# Patient Record
Sex: FEMALE | Race: WHITE | NOT HISPANIC OR LATINO | URBAN - METROPOLITAN AREA
[De-identification: names, ages, dates, MRNs, and addresses within clinical notes are randomized per-mention and may not be internally consistent; named-entity substitution may affect disease eponyms.]

---

## 2017-04-21 ENCOUNTER — OUTPATIENT (OUTPATIENT)
Dept: OUTPATIENT SERVICES | Facility: HOSPITAL | Age: 67
LOS: 1 days | End: 2017-04-21
Payer: MEDICARE

## 2017-04-21 PROCEDURE — 74177 CT ABD & PELVIS W/CONTRAST: CPT | Mod: 26

## 2017-04-22 ENCOUNTER — INPATIENT (INPATIENT)
Facility: HOSPITAL | Age: 67
LOS: 1 days | Discharge: HOME | DRG: 392 | End: 2017-04-24
Payer: MEDICARE

## 2017-04-22 VITALS
TEMPERATURE: 98 F | HEIGHT: 66 IN | RESPIRATION RATE: 18 BRPM | DIASTOLIC BLOOD PRESSURE: 80 MMHG | OXYGEN SATURATION: 96 % | WEIGHT: 196.21 LBS | HEART RATE: 62 BPM | SYSTOLIC BLOOD PRESSURE: 142 MMHG

## 2017-04-22 DIAGNOSIS — Z29.9 ENCOUNTER FOR PROPHYLACTIC MEASURES, UNSPECIFIED: ICD-10-CM

## 2017-04-22 DIAGNOSIS — F41.9 ANXIETY DISORDER, UNSPECIFIED: ICD-10-CM

## 2017-04-22 DIAGNOSIS — N80.9 ENDOMETRIOSIS, UNSPECIFIED: ICD-10-CM

## 2017-04-22 DIAGNOSIS — R10.9 UNSPECIFIED ABDOMINAL PAIN: ICD-10-CM

## 2017-04-22 LAB
ALBUMIN SERPL ELPH-MCNC: 3.8 G/DL — SIGNIFICANT CHANGE UP (ref 3.4–5)
ALP SERPL-CCNC: 79 U/L — SIGNIFICANT CHANGE UP (ref 40–120)
ALT FLD-CCNC: 18 U/L — SIGNIFICANT CHANGE UP (ref 12–42)
ANION GAP SERPL CALC-SCNC: 10 MMOL/L — SIGNIFICANT CHANGE UP (ref 9–16)
APPEARANCE UR: SIGNIFICANT CHANGE UP
AST SERPL-CCNC: 14 U/L — LOW (ref 15–37)
BASOPHILS NFR BLD AUTO: 0.5 % — SIGNIFICANT CHANGE UP (ref 0–2)
BILIRUB SERPL-MCNC: 0.4 MG/DL — SIGNIFICANT CHANGE UP (ref 0.2–1.2)
BILIRUB UR-MCNC: NEGATIVE — SIGNIFICANT CHANGE UP
BUN SERPL-MCNC: 12 MG/DL — SIGNIFICANT CHANGE UP (ref 7–23)
CALCIUM SERPL-MCNC: 9.3 MG/DL — SIGNIFICANT CHANGE UP (ref 8.5–10.5)
CHLORIDE SERPL-SCNC: 102 MMOL/L — SIGNIFICANT CHANGE UP (ref 96–108)
CO2 SERPL-SCNC: 25 MMOL/L — SIGNIFICANT CHANGE UP (ref 22–31)
COLOR SPEC: YELLOW — SIGNIFICANT CHANGE UP
CREAT SERPL-MCNC: 0.74 MG/DL — SIGNIFICANT CHANGE UP (ref 0.5–1.3)
DIFF PNL FLD: (no result)
EOSINOPHIL NFR BLD AUTO: 0.4 % — SIGNIFICANT CHANGE UP (ref 0–6)
GLUCOSE SERPL-MCNC: 95 MG/DL — SIGNIFICANT CHANGE UP (ref 70–99)
GLUCOSE UR QL: NEGATIVE — SIGNIFICANT CHANGE UP
HCT VFR BLD CALC: 43 % — SIGNIFICANT CHANGE UP (ref 34.5–45)
HGB BLD-MCNC: 15 G/DL — SIGNIFICANT CHANGE UP (ref 11.5–15.5)
KETONES UR-MCNC: (no result) MG/DL
LEUKOCYTE ESTERASE UR-ACNC: (no result)
LYMPHOCYTES # BLD AUTO: 20.6 % — SIGNIFICANT CHANGE UP (ref 13–44)
MCHC RBC-ENTMCNC: 30.5 PG — SIGNIFICANT CHANGE UP (ref 27–34)
MCHC RBC-ENTMCNC: 34.9 G/DL — SIGNIFICANT CHANGE UP (ref 32–36)
MCV RBC AUTO: 87.4 FL — SIGNIFICANT CHANGE UP (ref 80–100)
MONOCYTES NFR BLD AUTO: 7.7 % — SIGNIFICANT CHANGE UP (ref 2–14)
NEUTROPHILS NFR BLD AUTO: 70.8 % — SIGNIFICANT CHANGE UP (ref 43–77)
NITRITE UR-MCNC: NEGATIVE — SIGNIFICANT CHANGE UP
PH UR: 6 — SIGNIFICANT CHANGE UP (ref 5–8)
PLATELET # BLD AUTO: 256 K/UL — SIGNIFICANT CHANGE UP (ref 150–400)
POTASSIUM SERPL-MCNC: 4.5 MMOL/L — SIGNIFICANT CHANGE UP (ref 3.5–5.3)
POTASSIUM SERPL-SCNC: 4.5 MMOL/L — SIGNIFICANT CHANGE UP (ref 3.5–5.3)
PROT SERPL-MCNC: 7.9 G/DL — SIGNIFICANT CHANGE UP (ref 6.4–8.2)
PROT UR-MCNC: NEGATIVE MG/DL — SIGNIFICANT CHANGE UP
RBC # BLD: 4.92 M/UL — SIGNIFICANT CHANGE UP (ref 3.8–5.2)
RBC # FLD: 12.8 % — SIGNIFICANT CHANGE UP (ref 10.3–16.9)
SODIUM SERPL-SCNC: 137 MMOL/L — SIGNIFICANT CHANGE UP (ref 135–145)
SP GR SPEC: 1.01 — SIGNIFICANT CHANGE UP (ref 1–1.03)
UROBILINOGEN FLD QL: 0.2 E.U./DL — SIGNIFICANT CHANGE UP
WBC # BLD: 8 K/UL — SIGNIFICANT CHANGE UP (ref 3.8–10.5)
WBC # FLD AUTO: 8 K/UL — SIGNIFICANT CHANGE UP (ref 3.8–10.5)

## 2017-04-22 PROCEDURE — 93010 ELECTROCARDIOGRAM REPORT: CPT

## 2017-04-22 PROCEDURE — 99285 EMERGENCY DEPT VISIT HI MDM: CPT | Mod: 25

## 2017-04-22 RX ORDER — SODIUM CHLORIDE 9 MG/ML
1000 INJECTION INTRAMUSCULAR; INTRAVENOUS; SUBCUTANEOUS ONCE
Qty: 0 | Refills: 0 | Status: COMPLETED | OUTPATIENT
Start: 2017-04-22 | End: 2017-04-22

## 2017-04-22 RX ORDER — FAMOTIDINE 10 MG/ML
20 INJECTION INTRAVENOUS ONCE
Qty: 0 | Refills: 0 | Status: COMPLETED | OUTPATIENT
Start: 2017-04-22 | End: 2017-04-22

## 2017-04-22 RX ORDER — ONDANSETRON 8 MG/1
4 TABLET, FILM COATED ORAL ONCE
Qty: 0 | Refills: 0 | Status: COMPLETED | OUTPATIENT
Start: 2017-04-22 | End: 2017-04-22

## 2017-04-22 RX ORDER — SODIUM CHLORIDE 9 MG/ML
1000 INJECTION INTRAMUSCULAR; INTRAVENOUS; SUBCUTANEOUS
Qty: 0 | Refills: 0 | Status: DISCONTINUED | OUTPATIENT
Start: 2017-04-22 | End: 2017-04-24

## 2017-04-22 RX ORDER — PANTOPRAZOLE SODIUM 20 MG/1
40 TABLET, DELAYED RELEASE ORAL ONCE
Qty: 0 | Refills: 0 | Status: COMPLETED | OUTPATIENT
Start: 2017-04-22 | End: 2017-04-22

## 2017-04-22 RX ORDER — LIDOCAINE 4 G/100G
20 CREAM TOPICAL ONCE
Qty: 0 | Refills: 0 | Status: COMPLETED | OUTPATIENT
Start: 2017-04-22 | End: 2017-04-22

## 2017-04-22 RX ORDER — ACETAMINOPHEN 500 MG
650 TABLET ORAL EVERY 6 HOURS
Qty: 0 | Refills: 0 | Status: DISCONTINUED | OUTPATIENT
Start: 2017-04-22 | End: 2017-04-24

## 2017-04-22 RX ORDER — HEPARIN SODIUM 5000 [USP'U]/ML
5000 INJECTION INTRAVENOUS; SUBCUTANEOUS EVERY 8 HOURS
Qty: 0 | Refills: 0 | Status: DISCONTINUED | OUTPATIENT
Start: 2017-04-22 | End: 2017-04-24

## 2017-04-22 RX ORDER — MORPHINE SULFATE 50 MG/1
2 CAPSULE, EXTENDED RELEASE ORAL EVERY 4 HOURS
Qty: 0 | Refills: 0 | Status: DISCONTINUED | OUTPATIENT
Start: 2017-04-22 | End: 2017-04-22

## 2017-04-22 RX ORDER — ALPRAZOLAM 0.25 MG
0.25 TABLET ORAL EVERY 6 HOURS
Qty: 0 | Refills: 0 | Status: DISCONTINUED | OUTPATIENT
Start: 2017-04-22 | End: 2017-04-24

## 2017-04-22 RX ORDER — ONDANSETRON 8 MG/1
4 TABLET, FILM COATED ORAL EVERY 6 HOURS
Qty: 0 | Refills: 0 | Status: DISCONTINUED | OUTPATIENT
Start: 2017-04-22 | End: 2017-04-24

## 2017-04-22 RX ORDER — ALPRAZOLAM 0.25 MG
0 TABLET ORAL
Qty: 0 | Refills: 0 | COMMUNITY

## 2017-04-22 RX ORDER — MORPHINE SULFATE 50 MG/1
2 CAPSULE, EXTENDED RELEASE ORAL ONCE
Qty: 0 | Refills: 0 | Status: DISCONTINUED | OUTPATIENT
Start: 2017-04-22 | End: 2017-04-22

## 2017-04-22 RX ORDER — MORPHINE SULFATE 50 MG/1
1 CAPSULE, EXTENDED RELEASE ORAL EVERY 4 HOURS
Qty: 0 | Refills: 0 | Status: DISCONTINUED | OUTPATIENT
Start: 2017-04-22 | End: 2017-04-24

## 2017-04-22 RX ADMIN — ONDANSETRON 4 MILLIGRAM(S): 8 TABLET, FILM COATED ORAL at 16:16

## 2017-04-22 RX ADMIN — MORPHINE SULFATE 2 MILLIGRAM(S): 50 CAPSULE, EXTENDED RELEASE ORAL at 17:57

## 2017-04-22 RX ADMIN — PANTOPRAZOLE SODIUM 40 MILLIGRAM(S): 20 TABLET, DELAYED RELEASE ORAL at 16:31

## 2017-04-22 RX ADMIN — LIDOCAINE 20 MILLILITER(S): 4 CREAM TOPICAL at 16:31

## 2017-04-22 RX ADMIN — MORPHINE SULFATE 2 MILLIGRAM(S): 50 CAPSULE, EXTENDED RELEASE ORAL at 18:15

## 2017-04-22 RX ADMIN — FAMOTIDINE 20 MILLIGRAM(S): 10 INJECTION INTRAVENOUS at 16:31

## 2017-04-22 RX ADMIN — SODIUM CHLORIDE 1000 MILLILITER(S): 9 INJECTION INTRAMUSCULAR; INTRAVENOUS; SUBCUTANEOUS at 15:40

## 2017-04-22 RX ADMIN — ONDANSETRON 4 MILLIGRAM(S): 8 TABLET, FILM COATED ORAL at 17:57

## 2017-04-22 RX ADMIN — Medication 30 MILLILITER(S): at 16:31

## 2017-04-22 RX ADMIN — SODIUM CHLORIDE 100 MILLILITER(S): 9 INJECTION INTRAMUSCULAR; INTRAVENOUS; SUBCUTANEOUS at 21:48

## 2017-04-22 RX ADMIN — Medication 20 MILLIGRAM(S): at 21:48

## 2017-04-22 NOTE — H&P ADULT - PROBLEM SELECTOR PLAN 2
C/w paxil and xanax  (OBGYN) aware. Will follow up as outpatient Denies vaginal bleeding however must rule out endometrial pathology as source of pain  -Pelvic U/S

## 2017-04-22 NOTE — H&P ADULT - NSHPLABSRESULTS_GEN_ALL_CORE
15.0   8.0   )-----------( 256      ( 22 Apr 2017 15:06 )             43.0   04-22    137  |  102  |  12  ----------------------------<  95  4.5   |  25  |  0.74    Ca    9.3      22 Apr 2017 15:06    TPro  7.9  /  Alb  3.8  /  TBili  0.4  /  DBili  x   /  AST  14<L>  /  ALT  18  /  AlkPhos  79  04-22  Lipase: 84    CT w/ contrast: Prominent submucosal fat deposition in the terminal ileum which may be seen with repeated/chronic ileitis.  Gastric wall thickening versus underdistention.  6.1 cm soft tissue density lesion in the left adnexa which may represent an endometrioma. However, in order to exclude an  ovarian lesion, pelvic ultrasound is recommended.  Fibroid uterus.

## 2017-04-22 NOTE — ED ADULT NURSE NOTE - OBJECTIVE STATEMENT
67y F, A&ox3, came into Er c/o n/v, abdominal pain worsening since friday. States had CT performed yesterday that showed gastritis. Denies urinary s/s, denies fever nor chills. No guarding. Noted generalized tenderness to ruq and luq. Denies cp nor sob. +bowel sounds. Last bm yesterday, mildly loose. Lungs clear bilateral, no jvd, no edema. States pain is aching 3-7 intermittent. States over the counter meds help at times. NAD. EKG performed. Will continue to monitor.

## 2017-04-22 NOTE — ED ADULT TRIAGE NOTE - CHIEF COMPLAINT QUOTE
" I have ABD pain since Thursday Had CT yesterday showed I have gastritis again. I'm in pain. Nausea no vomiting. LBM yesterday."

## 2017-04-22 NOTE — H&P ADULT - PROBLEM SELECTOR PLAN 1
CT notable only for chronic terminal ileitis with unremarkable labs. Currently unable to tolerate PO due to symptoms. Dr. Lee to scope on Monday  -F/u GI recs tomorrow   -zofran, morphine PRN, advance diet as tolerated Unclear etiology as symptoms, history and physical exam do not point to a specific diagnosis. CT notable only for chronic terminal ileitis with unremarkable labs. Currently unable to tolerate PO due to symptoms. Dr. Lee to scope on Monday  -F/u GI recs tomorrow   -zofran, morphine PRN, advance diet as tolerated

## 2017-04-22 NOTE — ED PROVIDER NOTE - MEDICAL DECISION MAKING DETAILS
pt with severe epigastric pain ? gastritis and ileitis on prior ct 4/21/17 - unrelieved with meds at home, pe - epigastric tenderness, labs ok, received multiple antiemetics and gi cocktail with minimal relief of pain - discussed with Dr. Walden who will admit pt secondary to failure to tolerate po, pain relief and possible scope

## 2017-04-22 NOTE — ED PROVIDER NOTE - OBJECTIVE STATEMENT
68 y/o f with pmh of depression on xanax and paxil presents to ED with severe epigastric pain x several days unrelieved by omeprazole.  Pt states she has had intermittent vomiting and diarrhea since January but has not developed severe pain until recently.  Pt had ct abd/pelvis day prior showing chronic ileitis and ? gastritis.  Pt denies abd surgeries.  Normal bms. Some nausea and vomiting.  Pain worse after eating.  Denies smoking or etoh use.

## 2017-04-22 NOTE — H&P ADULT - HISTORY OF PRESENT ILLNESS
Patient is a 67 year old female with a PMHx of anxiety who presents with abdominal pain. Pain has been getting progressively worse since January but became acutely intolerable this past week. She reports being unable to eat due to pain (not 2/2 nausea or dysphagia/odynophagia). The pain is sharp, located mostly in the lower quadrants but without radiation, is intermittent and not associated with eating. She denies fevers, chills, diarrhea, melena, hematochezia or vomiting but does report occasional nausea. Patient is a long time smoker but denies hx of PUD, coffee or alcohol use.     In the ED VS were Tmax 98.6 HR 60 /70 RR 17 O2 97 on RA. She was given maalox, pepcid, viscous lidocaine, morphine, zofran and protonix with minimal relief.

## 2017-04-22 NOTE — H&P ADULT - NSHPREVIEWOFSYSTEMS_GEN_ALL_CORE
general: +polydipsia, no fevers, chills  heent: no dysphagia, odynophagia  lungs: no cough, SOB  cardio: no cp, palpitations  gi: see hpi  gu: no dysuria

## 2017-04-22 NOTE — ED ADULT NURSE NOTE - CHPI ED SYMPTOMS NEG
no blood in stool/no chills/no abdominal distension/no dysuria/no fever/no hematuria/no burning urination/no vomiting

## 2017-04-22 NOTE — H&P ADULT - NSHPPHYSICALEXAM_GEN_ALL_CORE
general: minimal distress 2/2 pain  heent: dry MM, neck supple, no JVD  cardio: +s1/s2, rrr  lungs: CTA b/l  gi: TTP in llq/rlq without rebound or guarding. normoactive bs, no HSM  ext: wwp  skin: no jaundice  neuro: aao x 3, no asterixis

## 2017-04-23 DIAGNOSIS — R11.0 NAUSEA: ICD-10-CM

## 2017-04-23 DIAGNOSIS — E78.5 HYPERLIPIDEMIA, UNSPECIFIED: ICD-10-CM

## 2017-04-23 DIAGNOSIS — R10.13 EPIGASTRIC PAIN: ICD-10-CM

## 2017-04-23 PROCEDURE — 76830 TRANSVAGINAL US NON-OB: CPT | Mod: 26

## 2017-04-23 PROCEDURE — 99221 1ST HOSP IP/OBS SF/LOW 40: CPT | Mod: GC

## 2017-04-23 PROCEDURE — 76856 US EXAM PELVIC COMPLETE: CPT | Mod: 26

## 2017-04-23 RX ORDER — SOD SULF/SODIUM/NAHCO3/KCL/PEG
4000 SOLUTION, RECONSTITUTED, ORAL ORAL ONCE
Qty: 0 | Refills: 0 | Status: COMPLETED | OUTPATIENT
Start: 2017-04-23 | End: 2017-04-23

## 2017-04-23 RX ORDER — SOD SULF/SODIUM/NAHCO3/KCL/PEG
4000 SOLUTION, RECONSTITUTED, ORAL ORAL ONCE
Qty: 0 | Refills: 0 | Status: COMPLETED | OUTPATIENT
Start: 2017-04-23 | End: 2017-04-24

## 2017-04-23 RX ADMIN — MORPHINE SULFATE 1 MILLIGRAM(S): 50 CAPSULE, EXTENDED RELEASE ORAL at 11:37

## 2017-04-23 RX ADMIN — Medication 4000 MILLILITER(S): at 18:06

## 2017-04-23 RX ADMIN — ONDANSETRON 4 MILLIGRAM(S): 8 TABLET, FILM COATED ORAL at 03:45

## 2017-04-23 RX ADMIN — Medication 20 MILLIGRAM(S): at 19:52

## 2017-04-23 RX ADMIN — Medication 0.25 MILLIGRAM(S): at 06:26

## 2017-04-23 RX ADMIN — MORPHINE SULFATE 1 MILLIGRAM(S): 50 CAPSULE, EXTENDED RELEASE ORAL at 11:22

## 2017-04-23 NOTE — PROGRESS NOTE ADULT - ASSESSMENT
67 year old female with abdominal pain for a number of months. Now with severe nausea and unable to eat. No diarrhea or vomiting. Just nausea

## 2017-04-23 NOTE — PROGRESS NOTE ADULT - PROBLEM SELECTOR PLAN 1
Unclear etiology as symptoms, history and physical exam do not point to a specific diagnosis. CT notable only for chronic terminal ileitis with unremarkable labs. Currently unable to tolerate PO due to symptoms. Dr. Lee to scope on Monday  -F/u GI recs tomorrow   -zofran, morphine PRN, advance diet as tolerated Unclear etiology as symptoms, history and physical exam do not point to a specific diagnosis. CT notable only for chronic terminal ileitis and possible gastric wall thickening. Currently unable to tolerate PO due to symptoms. Dr. Lee to scope on Monday  -F/u GI recs   -zofran, morphine PRN, advance diet as tolerated  -NPO after MN for EGD tomorrow

## 2017-04-23 NOTE — PROGRESS NOTE ADULT - PROBLEM SELECTOR PLAN 4
HSQ, replete lytes PRN, regular diet, IVF HSQ, replete lytes PRN, regular diet, IVF    FULL CODE    DISPO: [email or web address]

## 2017-04-23 NOTE — PROGRESS NOTE ADULT - ASSESSMENT
Patient is a 67 year old female who presents with abdominal pain Patient is a 67 year old female with anxiety and h/o gastritis who presents with abdominal pain.

## 2017-04-24 VITALS
OXYGEN SATURATION: 98 % | RESPIRATION RATE: 18 BRPM | SYSTOLIC BLOOD PRESSURE: 128 MMHG | HEART RATE: 57 BPM | TEMPERATURE: 99 F | DIASTOLIC BLOOD PRESSURE: 72 MMHG

## 2017-04-24 DIAGNOSIS — Z29.9 ENCOUNTER FOR PROPHYLACTIC MEASURES, UNSPECIFIED: ICD-10-CM

## 2017-04-24 DIAGNOSIS — R63.8 OTHER SYMPTOMS AND SIGNS CONCERNING FOOD AND FLUID INTAKE: ICD-10-CM

## 2017-04-24 LAB
ANION GAP SERPL CALC-SCNC: 8 MMOL/L — LOW (ref 9–16)
BUN SERPL-MCNC: 10 MG/DL — SIGNIFICANT CHANGE UP (ref 7–23)
CALCIUM SERPL-MCNC: 8.7 MG/DL — SIGNIFICANT CHANGE UP (ref 8.5–10.5)
CHLORIDE SERPL-SCNC: 109 MMOL/L — HIGH (ref 96–108)
CO2 SERPL-SCNC: 28 MMOL/L — SIGNIFICANT CHANGE UP (ref 22–31)
CREAT SERPL-MCNC: 0.8 MG/DL — SIGNIFICANT CHANGE UP (ref 0.5–1.3)
GLUCOSE SERPL-MCNC: 93 MG/DL — SIGNIFICANT CHANGE UP (ref 70–99)
HCT VFR BLD CALC: 40.7 % — SIGNIFICANT CHANGE UP (ref 34.5–45)
HGB BLD-MCNC: 13.7 G/DL — SIGNIFICANT CHANGE UP (ref 11.5–15.5)
MAGNESIUM SERPL-MCNC: 2 MG/DL — SIGNIFICANT CHANGE UP (ref 1.6–2.4)
MCHC RBC-ENTMCNC: 29.8 PG — SIGNIFICANT CHANGE UP (ref 27–34)
MCHC RBC-ENTMCNC: 33.7 G/DL — SIGNIFICANT CHANGE UP (ref 32–36)
MCV RBC AUTO: 88.7 FL — SIGNIFICANT CHANGE UP (ref 80–100)
PLATELET # BLD AUTO: 275 K/UL — SIGNIFICANT CHANGE UP (ref 150–400)
POTASSIUM SERPL-MCNC: 4 MMOL/L — SIGNIFICANT CHANGE UP (ref 3.5–5.3)
POTASSIUM SERPL-SCNC: 4 MMOL/L — SIGNIFICANT CHANGE UP (ref 3.5–5.3)
RBC # BLD: 4.59 M/UL — SIGNIFICANT CHANGE UP (ref 3.8–5.2)
RBC # FLD: 12.8 % — SIGNIFICANT CHANGE UP (ref 10.3–16.9)
SODIUM SERPL-SCNC: 145 MMOL/L — SIGNIFICANT CHANGE UP (ref 135–145)
WBC # BLD: 6.7 K/UL — SIGNIFICANT CHANGE UP (ref 3.8–10.5)
WBC # FLD AUTO: 6.7 K/UL — SIGNIFICANT CHANGE UP (ref 3.8–10.5)

## 2017-04-24 PROCEDURE — 43239 EGD BIOPSY SINGLE/MULTIPLE: CPT

## 2017-04-24 PROCEDURE — 80048 BASIC METABOLIC PNL TOTAL CA: CPT

## 2017-04-24 PROCEDURE — 96375 TX/PRO/DX INJ NEW DRUG ADDON: CPT

## 2017-04-24 PROCEDURE — 85027 COMPLETE CBC AUTOMATED: CPT

## 2017-04-24 PROCEDURE — 45380 COLONOSCOPY AND BIOPSY: CPT

## 2017-04-24 PROCEDURE — 83690 ASSAY OF LIPASE: CPT

## 2017-04-24 PROCEDURE — 85025 COMPLETE CBC W/AUTO DIFF WBC: CPT

## 2017-04-24 PROCEDURE — 83735 ASSAY OF MAGNESIUM: CPT

## 2017-04-24 PROCEDURE — 96376 TX/PRO/DX INJ SAME DRUG ADON: CPT

## 2017-04-24 PROCEDURE — 96374 THER/PROPH/DIAG INJ IV PUSH: CPT

## 2017-04-24 PROCEDURE — 80053 COMPREHEN METABOLIC PANEL: CPT

## 2017-04-24 PROCEDURE — 88305 TISSUE EXAM BY PATHOLOGIST: CPT

## 2017-04-24 PROCEDURE — 93005 ELECTROCARDIOGRAM TRACING: CPT

## 2017-04-24 PROCEDURE — 76856 US EXAM PELVIC COMPLETE: CPT

## 2017-04-24 PROCEDURE — 36415 COLL VENOUS BLD VENIPUNCTURE: CPT

## 2017-04-24 PROCEDURE — 74177 CT ABD & PELVIS W/CONTRAST: CPT

## 2017-04-24 PROCEDURE — 99285 EMERGENCY DEPT VISIT HI MDM: CPT | Mod: 25

## 2017-04-24 PROCEDURE — 76830 TRANSVAGINAL US NON-OB: CPT

## 2017-04-24 PROCEDURE — 81001 URINALYSIS AUTO W/SCOPE: CPT

## 2017-04-24 RX ORDER — ONDANSETRON 8 MG/1
1 TABLET, FILM COATED ORAL
Qty: 10 | Refills: 0 | OUTPATIENT
Start: 2017-04-24

## 2017-04-24 RX ORDER — PANTOPRAZOLE SODIUM 20 MG/1
1 TABLET, DELAYED RELEASE ORAL
Qty: 14 | Refills: 0 | OUTPATIENT
Start: 2017-04-24 | End: 2017-05-08

## 2017-04-24 RX ADMIN — Medication 4000 MILLILITER(S): at 04:08

## 2017-04-24 NOTE — PROGRESS NOTE ADULT - SUBJECTIVE AND OBJECTIVE BOX
INTERVAL HPI/OVERNIGHT EVENTS:  Patient seen and examined .  who is a physician was present during interview. Patient with abdominal pain on and off since January . Now with persistent nausea and unable to eat. CT of abdomen done and results noted. Medical history significant for HLD and anxiety. Never had a colonoscopy.       MEDICATIONS  (STANDING):  heparin  Injectable 5000Unit(s) SubCutaneous every 8 hours  sodium chloride 0.9%. 1000milliLiter(s) IV Continuous <Continuous>  PARoxetine 20milliGRAM(s) Oral daily    MEDICATIONS  (PRN):  ondansetron Injectable 4milliGRAM(s) IV Push every 6 hours PRN Nausea  acetaminophen   Tablet. 650milliGRAM(s) Oral every 6 hours PRN Mild Pain (1 - 3)  ALPRAZolam 0.25milliGRAM(s) Oral every 6 hours PRN anxiety  morphine  - Injectable 1milliGRAM(s) IV Push every 4 hours PRN Moderate Pain (4 - 6)      Allergies    No Known Allergies    Intolerances        Vital Signs Last 24 Hrs  T(C): 36.6, Max: 37 ( @ 17:57)  T(F): 97.8, Max: 98.6 ( @ 17:57)  HR: 50 (44 - 60)  BP: 147/77 (124/63 - 147/77)  BP(mean): --  RR: 18 (16 - 18)  SpO2: 100% (97% - 100%)          Constitutional: Awake and alert. No acute distress    Eyes: BARRETT    ENMT: Negative    Neck: Supple    Back:  no tenderness     Respiratory:   clear    Cardiovascular: S1 S2    Gastrointestinal: soft. Some upper abdominal pain    Genitourinary:    Extremities: no edema    Vascular:    Neurological: intact    Skin:    Lymph Nodes:            I & Os for current day (as of  @ 15:35)  =============================================  IN: 1100 ml / OUT: 0 ml / NET: 1100 ml    LABS:                        15.0   8.0   )-----------( 256      ( 2017 15:06 )             43.0         137  |  102  |  12  ----------------------------<  95  4.5   |  25  |  0.74    Ca    9.3      2017 15:06    TPro  7.9  /  Alb  3.8  /  TBili  0.4  /  DBili  x   /  AST  14<L>  /  ALT  18  /  AlkPhos  79  04-22      Urinalysis Basic - ( 2017 20:52 )    Color: Yellow / Appearance: SL CLOUDY / S.010 / pH: x  Gluc: x / Ketone: Trace mg/dL  / Bili: NEGATIVE / Urobili: 0.2 E.U./dL   Blood: x / Protein: NEGATIVE mg/dL / Nitrite: NEGATIVE   Leuk Esterase: Small / RBC: < 5 /HPF / WBC < 5 /HPF   Sq Epi: x / Non Sq Epi: Many /HPF / Bacteria: Present /HPF        RADIOLOGY & ADDITIONAL TESTS:
INTERVAL HPI/OVERNIGHT EVENTS:  ADRY  Pt was seen and examined at the bedside. She was observed to be lying down comfortably.   Pt c/o abdominal pain and nausea. She said that this am she got breakfast and even the smell of it made her so nauseous that she wanted to vomit. She c/o ongoing abdominal pain in her upper abdomen had large BM yesterday after oral contrast ingestion.    VITAL SIGNS:  T(F): 97.8  HR: 50  BP: 147/77  RR: 18  SpO2: 100%  Wt(kg): --  I&O's Summary    I & Os for current day (as of 2017 11:46)  =============================================  IN: 1100 ml / OUT: 0 ml / NET: 1100 ml    PHYSICAL EXAM:  Constitutional: NAD, well-groomed, well-developed, obese  HEENT: PERRLA, EOMI, Normal Hearing, MMM  Neck: No LAD, No JVD  Back: Normal spine flexure, No CVA tenderness  Respiratory: CTAB  Cardiovascular: S1 and S2, RRR, no M/G/R  Gastrointestinal: BS+, soft, ND, obese, tenderness to palpation in RUQ and LUQ, mostly in epigastric area  Extremities: No peripheral edema  Vascular: 2+ peripheral pulses  Neurological: A/O x 3, no focal deficits  Psychiatric: Normal mood, normal affect  Musculoskeletal: 5/5 strength b/l upper and lower extremities  Skin: No rashes        MEDICATIONS  (STANDING):  heparin  Injectable 5000Unit(s) SubCutaneous every 8 hours  sodium chloride 0.9%. 1000milliLiter(s) IV Continuous <Continuous>  PARoxetine 20milliGRAM(s) Oral daily    MEDICATIONS  (PRN):  ondansetron Injectable 4milliGRAM(s) IV Push every 6 hours PRN Nausea  acetaminophen   Tablet. 650milliGRAM(s) Oral every 6 hours PRN Mild Pain (1 - 3)  ALPRAZolam 0.25milliGRAM(s) Oral every 6 hours PRN anxiety  morphine  - Injectable 1milliGRAM(s) IV Push every 4 hours PRN Moderate Pain (4 - 6)      Allergies    No Known Allergies    Intolerances        LABS:                        15.0   8.0   )-----------( 256      ( 2017 15:06 )             43.0     -    137  |  102  |  12  ----------------------------<  95  4.5   |  25  |  0.74    Ca    9.3      2017 15:06    TPro  7.9  /  Alb  3.8  /  TBili  0.4  /  DBili  x   /  AST  14<L>  /  ALT  18  /  AlkPhos  79        Urinalysis Basic - ( 2017 20:52 )    Color: Yellow / Appearance: SL CLOUDY / S.010 / pH: x  Gluc: x / Ketone: Trace mg/dL  / Bili: NEGATIVE / Urobili: 0.2 E.U./dL   Blood: x / Protein: NEGATIVE mg/dL / Nitrite: NEGATIVE   Leuk Esterase: Small / RBC: < 5 /HPF / WBC < 5 /HPF   Sq Epi: x / Non Sq Epi: Many /HPF / Bacteria: Present /HPF        RADIOLOGY & ADDITIONAL TESTS:
INTERVAL HPI/OVERNIGHT EVENTS:  Having clear BMs since last night, drank all [place].  Pt was seen and examined at the bedside. She was observed to be lying down comfortably.   Pt c/o several BMs, ow no abdominal pain CP or SOB.    VITAL SIGNS:  T(F): 97.7  HR: 48  BP: 127/60  RR: 18  SpO2: 99%  Wt(kg): --  I&O's Summary    I & Os for current day (as of 2017 08:01)  =============================================  IN: 2460 ml / OUT: 0 ml / NET: 2460 ml    PHYSICAL EXAM:  Constitutional: NAD, well-groomed, well-developed, obese  HEENT: PERRLA, EOMI, Normal Hearing, MMM  Neck: No LAD, No JVD  Back: Normal spine flexure, No CVA tenderness  Respiratory: CTAB  Cardiovascular: S1 and S2, RRR, no M/G/R  Gastrointestinal: BS+, soft, ND, obese, tenderness to palpation in RUQ and LUQ, mostly in epigastric area  Extremities: No peripheral edema  Vascular: 2+ peripheral pulses  Neurological: A/O x 3, no focal deficits  Psychiatric: Normal mood, normal affect  Musculoskeletal: 5/5 strength b/l upper and lower extremities  Skin: No rashes          MEDICATIONS  (STANDING):  heparin  Injectable 5000Unit(s) SubCutaneous every 8 hours  sodium chloride 0.9%. 1000milliLiter(s) IV Continuous <Continuous>  PARoxetine 20milliGRAM(s) Oral daily    MEDICATIONS  (PRN):  ondansetron Injectable 4milliGRAM(s) IV Push every 6 hours PRN Nausea  acetaminophen   Tablet. 650milliGRAM(s) Oral every 6 hours PRN Mild Pain (1 - 3)  ALPRAZolam 0.25milliGRAM(s) Oral every 6 hours PRN anxiety  morphine  - Injectable 1milliGRAM(s) IV Push every 4 hours PRN Moderate Pain (4 - 6)      Allergies    No Known Allergies    Intolerances        LABS:                        13.7   6.7   )-----------( 275      ( 2017 07:30 )             40.7     04-22    137  |  102  |  12  ----------------------------<  95  4.5   |  25  |  0.74    Ca    9.3      2017 15:06    TPro  7.9  /  Alb  3.8  /  TBili  0.4  /  DBili  x   /  AST  14<L>  /  ALT  18  /  AlkPhos  79  04-22      Urinalysis Basic - ( 2017 20:52 )    Color: Yellow / Appearance: SL CLOUDY / S.010 / pH: x  Gluc: x / Ketone: Trace mg/dL  / Bili: NEGATIVE / Urobili: 0.2 E.U./dL   Blood: x / Protein: NEGATIVE mg/dL / Nitrite: NEGATIVE   Leuk Esterase: Small / RBC: < 5 /HPF / WBC < 5 /HPF   Sq Epi: x / Non Sq Epi: Many /HPF / Bacteria: Present /HPF        RADIOLOGY & ADDITIONAL TESTS:

## 2017-04-24 NOTE — DISCHARGE NOTE ADULT - CARE PROVIDER_API CALL
Mario Lee), Gastroenterology  [address] [address]  Phone: [phone]  Fax: [phone]

## 2017-04-24 NOTE — DISCHARGE NOTE ADULT - PLAN OF CARE
resolved You were admitted for abdominal pain. Your CT showed you have gastric wall thickening and chronic ileitis (inflammation of a wall of your intestine). You underwent an upper and lower endoscopy which showed.... Please see above for details. Continue home medications and see your primary care doctor. You were admitted for abdominal pain. Your CT showed you have gastric wall thickening and chronic ileitis (inflammation of a wall of your intestine). You underwent an upper and lower endoscopy which showed gastritis and a poly (which was biopsied). Please follow up with  to obtain the results.

## 2017-04-24 NOTE — DISCHARGE NOTE ADULT - PATIENT PORTAL LINK FT
“You can access the Follow[employer] Patient Portal, offered by [facility], by registering with the following website: [email or web address]”

## 2017-04-24 NOTE — DISCHARGE NOTE ADULT - CARE PLAN
Principal Discharge DX:	Abdominal pain  Goal:	resolved  Instructions for follow-up, activity and diet:	You were admitted for abdominal pain. Your CT showed you have gastric wall thickening and chronic ileitis (inflammation of a wall of your intestine). You underwent an upper and lower endoscopy which showed....  Secondary Diagnosis:	Nausea  Instructions for follow-up, activity and diet:	Please see above for details.  Secondary Diagnosis:	Anxiety  Instructions for follow-up, activity and diet:	Continue home medications and see your primary care doctor.  Secondary Diagnosis:	Hyperlipidemia, unspecified hyperlipidemia type  Instructions for follow-up, activity and diet:	Continue home medications and see your primary care doctor. Principal Discharge DX:	Abdominal pain  Goal:	resolved  Instructions for follow-up, activity and diet:	You were admitted for abdominal pain. Your CT showed you have gastric wall thickening and chronic ileitis (inflammation of a wall of your intestine). You underwent an upper and lower endoscopy which showed gastritis and a poly (which was biopsied). Please follow up with  to obtain the results.  Secondary Diagnosis:	Nausea  Instructions for follow-up, activity and diet:	Please see above for details.  Secondary Diagnosis:	Anxiety  Instructions for follow-up, activity and diet:	Continue home medications and see your primary care doctor.  Secondary Diagnosis:	Hyperlipidemia, unspecified hyperlipidemia type  Instructions for follow-up, activity and diet:	Continue home medications and see your primary care doctor.

## 2017-04-24 NOTE — DISCHARGE NOTE ADULT - INSTRUCTIONS
regular diet Ipatient is to follow with GI regarding the results of the biopsy.  Se is also to follow with gyn regarding the findings on the pelvic US

## 2017-04-24 NOTE — PROGRESS NOTE ADULT - PROBLEM SELECTOR PROBLEM 3
Anxiety
Hyperlipidemia, unspecified hyperlipidemia type
Hyperlipidemia, unspecified hyperlipidemia type

## 2017-04-24 NOTE — DISCHARGE NOTE ADULT - MEDICATION SUMMARY - MEDICATIONS TO TAKE
I will START or STAY ON the medications listed below when I get home from the hospital:    Paxil 20 mg oral tablet  -- 1 tab(s) by mouth once a day  -- Indication: For Anxiety    ondansetron 4 mg oral tablet  -- 1 tab(s) by mouth every 8 hours  -- Indication: For Nausea    Xanax 0.25 mg oral tablet  --  by mouth   -- Indication: For Anxiety    pantoprazole 40 mg oral delayed release tablet  -- 1 tab(s) by mouth once a day  -- It is very important that you take or use this exactly as directed.  Do not skip doses or discontinue unless directed by your doctor.  Obtain medical advice before taking any non-prescription drugs as some may affect the action of this medication.  Swallow whole.  Do not crush.    -- Indication: For GASTRITIS

## 2017-04-24 NOTE — PROGRESS NOTE ADULT - ASSESSMENT
67 year old female with abdominal pain for a number of months. Now with severe nausea and unable to eat. No diarrhea or vomiting. Just nausea 67 year old female with abdominal pain for a number of months. Now with severe nausea and unable to eat.

## 2017-04-24 NOTE — DISCHARGE NOTE ADULT - HOSPITAL COURSE
67 year old female with a PMHx of anxiety who presents with abdominal pain admitted for work up of pain etiology. During hospital stay VSS, labs unremarkable, pain controleld with pain meds. CT abdomen done on admission showing possible gastric wall thickening and chronic ileitis. patient seen by GI and underwent EGD and C-scope on 4.24 with findings of.....    The patient was given return precautions in regards to when to come back to the emergency department. She verbalized understanding of both follow up instructions and return precautions.   The patient is medically optimized and hemodynamically stable and ready for discharge. 67 year old female with a PMHx of anxiety who presents with abdominal pain admitted for work up of pain etiology. During hospital stay VSS, labs unremarkable, pain controleld with pain meds. CT abdomen done on admission showing possible gastric wall thickening and chronic ileitis. patient seen by GI and underwent EGD and C-scope on 4.24 with findings of gastritis and a polyp, which was biopsied. She has been HD stable for discharge.    The patient was given return precautions in regards to when to come back to the emergency department. She verbalized understanding of both follow up instructions and return precautions.   The patient is medically optimized and hemodynamically stable and ready for discharge.

## 2017-04-24 NOTE — PROGRESS NOTE ADULT - ATTENDING COMMENTS
patient was seen by Dr Ryan
Patient seen and examined. For upper endoscopy. Further plans after procedure
the patient was seen and examined.  I discussed the case in details with the resident.  Plan was outlined.  I discussed the endoscopy findings with Dr Lee and she is stable with no clinical evidence of GI bleeding follow on bx

## 2017-04-24 NOTE — PROGRESS NOTE ADULT - PROBLEM SELECTOR PLAN 1
Unclear etiology of abdominal pain. CT noted. For upper endoscopy tomorrow. May need colonoscopy. Unclear etiology of abdominal pain. CT with findings of ileitis and gastric thickening. Bowel prep successful.  -EGD and C-scope today  -fu GI recs

## 2017-04-25 LAB — SURGICAL PATHOLOGY STUDY: SIGNIFICANT CHANGE UP

## 2017-04-27 DIAGNOSIS — K29.70 GASTRITIS, UNSPECIFIED, WITHOUT BLEEDING: ICD-10-CM

## 2017-04-27 DIAGNOSIS — K52.9 NONINFECTIVE GASTROENTERITIS AND COLITIS, UNSPECIFIED: ICD-10-CM

## 2017-04-27 DIAGNOSIS — D12.4 BENIGN NEOPLASM OF DESCENDING COLON: ICD-10-CM

## 2017-04-27 DIAGNOSIS — E78.5 HYPERLIPIDEMIA, UNSPECIFIED: ICD-10-CM

## 2017-04-27 DIAGNOSIS — F41.9 ANXIETY DISORDER, UNSPECIFIED: ICD-10-CM

## 2017-04-27 DIAGNOSIS — N80.0 ENDOMETRIOSIS OF UTERUS: ICD-10-CM

## 2017-04-27 DIAGNOSIS — F17.210 NICOTINE DEPENDENCE, CIGARETTES, UNCOMPLICATED: ICD-10-CM

## 2017-04-27 DIAGNOSIS — E66.9 OBESITY, UNSPECIFIED: ICD-10-CM

## 2017-04-27 DIAGNOSIS — R11.0 NAUSEA: ICD-10-CM

## 2017-04-27 DIAGNOSIS — Z80.1 FAMILY HISTORY OF MALIGNANT NEOPLASM OF TRACHEA, BRONCHUS AND LUNG: ICD-10-CM

## 2017-04-27 DIAGNOSIS — R10.32 LEFT LOWER QUADRANT PAIN: ICD-10-CM

## 2017-05-17 PROBLEM — Z00.00 ENCOUNTER FOR PREVENTIVE HEALTH EXAMINATION: Status: ACTIVE | Noted: 2017-05-17

## 2017-05-30 PROBLEM — F41.9 ANXIETY DISORDER, UNSPECIFIED: Chronic | Status: ACTIVE | Noted: 2017-04-22

## 2017-06-06 ENCOUNTER — APPOINTMENT (OUTPATIENT)
Dept: GASTROENTEROLOGY | Facility: CLINIC | Age: 67
End: 2017-06-06

## 2017-06-06 VITALS
SYSTOLIC BLOOD PRESSURE: 154 MMHG | WEIGHT: 185 LBS | BODY MASS INDEX: 30.82 KG/M2 | RESPIRATION RATE: 14 BRPM | HEIGHT: 65 IN | OXYGEN SATURATION: 98 % | TEMPERATURE: 97.9 F | HEART RATE: 64 BPM | DIASTOLIC BLOOD PRESSURE: 81 MMHG

## 2017-06-06 DIAGNOSIS — R10.9 UNSPECIFIED ABDOMINAL PAIN: ICD-10-CM

## 2017-06-06 RX ORDER — PAROXETINE HYDROCHLORIDE 40 MG/1
TABLET, FILM COATED ORAL
Refills: 0 | Status: ACTIVE | COMMUNITY

## 2017-06-06 RX ORDER — OMEPRAZOLE 20 MG/1
TABLET, DELAYED RELEASE ORAL
Refills: 0 | Status: ACTIVE | COMMUNITY

## 2017-06-19 ENCOUNTER — APPOINTMENT (OUTPATIENT)
Dept: GASTROENTEROLOGY | Facility: CLINIC | Age: 67
End: 2017-06-19

## 2017-06-19 VITALS
OXYGEN SATURATION: 97 % | HEIGHT: 65 IN | HEART RATE: 65 BPM | TEMPERATURE: 98 F | BODY MASS INDEX: 30.82 KG/M2 | RESPIRATION RATE: 14 BRPM | WEIGHT: 185 LBS | DIASTOLIC BLOOD PRESSURE: 72 MMHG | SYSTOLIC BLOOD PRESSURE: 135 MMHG

## 2017-06-19 DIAGNOSIS — Z82.49 FAMILY HISTORY OF ISCHEMIC HEART DISEASE AND OTHER DISEASES OF THE CIRCULATORY SYSTEM: ICD-10-CM

## 2017-06-23 ENCOUNTER — APPOINTMENT (OUTPATIENT)
Dept: GASTROENTEROLOGY | Facility: CLINIC | Age: 67
End: 2017-06-23

## 2017-07-18 ENCOUNTER — APPOINTMENT (OUTPATIENT)
Dept: GASTROENTEROLOGY | Facility: CLINIC | Age: 67
End: 2017-07-18

## 2019-01-11 NOTE — H&P ADULT - NSCORESITESY/N_GEN_A_CORE_RD
1020 PROCEDURE COMPLETED DR ERWIN AT BEDSIDE VSS REPEATED FROM SITTING IN CHAIR TO STANDING. CM AND PIV DC'D NOW REDRESSED 1045 DISCHARGED AMB WITH BELONGINGS ACCOMPANIED BY THIS RN AND FRIEND   No

## 2020-03-26 NOTE — DISCHARGE NOTE ADULT - NS AS DC STROKE DX YN
Voicemail message left for pt to contact the clinic.    Calling to see if she picked up the Acyclovir ointment and to inform her she can increase the Valtrex to 500 mg BID. If symptoms persist, will consider referral to dermatology.   no

## 2022-01-26 ENCOUNTER — TRANSCRIPTION ENCOUNTER (OUTPATIENT)
Age: 72
End: 2022-01-26

## 2022-01-26 ENCOUNTER — OUTPATIENT (OUTPATIENT)
Dept: OUTPATIENT SERVICES | Facility: HOSPITAL | Age: 72
LOS: 1 days | End: 2022-01-26
Payer: MEDICARE

## 2022-01-26 ENCOUNTER — APPOINTMENT (OUTPATIENT)
Dept: ULTRASOUND IMAGING | Facility: HOSPITAL | Age: 72
End: 2022-01-26
Payer: MEDICARE

## 2022-01-26 PROCEDURE — 76700 US EXAM ABDOM COMPLETE: CPT | Mod: 26

## 2022-01-26 PROCEDURE — 76700 US EXAM ABDOM COMPLETE: CPT

## 2023-01-19 NOTE — PATIENT PROFILE ADULT. - ATTEMPT TO OOB
Area M Indication Text: Tumors in this location are included in Area M (cheek, forehead, scalp, neck, jawline and pretibial skin).  Mohs surgery is indicated for tumors in these anatomic locations. no

## 2024-03-15 ENCOUNTER — APPOINTMENT (OUTPATIENT)
Dept: NUCLEAR MEDICINE | Facility: HOSPITAL | Age: 74
End: 2024-03-15

## 2024-03-15 ENCOUNTER — OUTPATIENT (OUTPATIENT)
Dept: OUTPATIENT SERVICES | Facility: HOSPITAL | Age: 74
LOS: 1 days | End: 2024-03-15
Payer: MEDICARE

## 2024-03-15 PROCEDURE — 78227 HEPATOBIL SYST IMAGE W/DRUG: CPT | Mod: 26,MH

## 2024-03-15 PROCEDURE — A9537: CPT

## 2024-03-15 PROCEDURE — 78227 HEPATOBIL SYST IMAGE W/DRUG: CPT | Mod: MH

## 2024-05-01 ENCOUNTER — APPOINTMENT (OUTPATIENT)
Dept: RADIOLOGY | Facility: HOSPITAL | Age: 74
End: 2024-05-01
Payer: MEDICARE

## 2024-05-01 ENCOUNTER — OUTPATIENT (OUTPATIENT)
Dept: OUTPATIENT SERVICES | Facility: HOSPITAL | Age: 74
LOS: 1 days | End: 2024-05-01

## 2024-05-01 PROCEDURE — 74220 X-RAY XM ESOPHAGUS 1CNTRST: CPT | Mod: 26

## 2024-05-01 PROCEDURE — 74220 X-RAY XM ESOPHAGUS 1CNTRST: CPT

## 2024-05-04 ENCOUNTER — TRANSCRIPTION ENCOUNTER (OUTPATIENT)
Age: 74
End: 2024-05-04

## 2024-05-05 ENCOUNTER — INPATIENT (INPATIENT)
Facility: HOSPITAL | Age: 74
LOS: 0 days | Discharge: HOME | DRG: 419 | End: 2024-05-05
Attending: SURGERY | Admitting: SURGERY
Payer: COMMERCIAL

## 2024-05-05 ENCOUNTER — TRANSCRIPTION ENCOUNTER (OUTPATIENT)
Age: 74
End: 2024-05-05

## 2024-05-05 VITALS
OXYGEN SATURATION: 99 % | DIASTOLIC BLOOD PRESSURE: 82 MMHG | WEIGHT: 190.04 LBS | HEART RATE: 69 BPM | RESPIRATION RATE: 20 BRPM | SYSTOLIC BLOOD PRESSURE: 141 MMHG | TEMPERATURE: 98 F

## 2024-05-05 VITALS
RESPIRATION RATE: 18 BRPM | OXYGEN SATURATION: 94 % | DIASTOLIC BLOOD PRESSURE: 71 MMHG | HEART RATE: 62 BPM | TEMPERATURE: 98 F | SYSTOLIC BLOOD PRESSURE: 132 MMHG

## 2024-05-05 DIAGNOSIS — Z98.890 OTHER SPECIFIED POSTPROCEDURAL STATES: Chronic | ICD-10-CM

## 2024-05-05 LAB
ALBUMIN SERPL ELPH-MCNC: 4.2 G/DL — SIGNIFICANT CHANGE UP (ref 3.3–5)
ALP SERPL-CCNC: 101 U/L — SIGNIFICANT CHANGE UP (ref 40–120)
ALT FLD-CCNC: 35 U/L — SIGNIFICANT CHANGE UP (ref 10–45)
ANION GAP SERPL CALC-SCNC: 11 MMOL/L — SIGNIFICANT CHANGE UP (ref 5–17)
APPEARANCE UR: CLEAR — SIGNIFICANT CHANGE UP
APTT BLD: 32.5 SEC — SIGNIFICANT CHANGE UP (ref 24.5–35.6)
AST SERPL-CCNC: 89 U/L — HIGH (ref 10–40)
BASOPHILS # BLD AUTO: 0.05 K/UL — SIGNIFICANT CHANGE UP (ref 0–0.2)
BASOPHILS NFR BLD AUTO: 0.6 % — SIGNIFICANT CHANGE UP (ref 0–2)
BILIRUB SERPL-MCNC: 0.6 MG/DL — SIGNIFICANT CHANGE UP (ref 0.2–1.2)
BILIRUB UR-MCNC: NEGATIVE — SIGNIFICANT CHANGE UP
BLD GP AB SCN SERPL QL: NEGATIVE — SIGNIFICANT CHANGE UP
BUN SERPL-MCNC: 25 MG/DL — HIGH (ref 7–23)
CALCIUM SERPL-MCNC: 10.2 MG/DL — SIGNIFICANT CHANGE UP (ref 8.4–10.5)
CHLORIDE SERPL-SCNC: 99 MMOL/L — SIGNIFICANT CHANGE UP (ref 96–108)
CO2 SERPL-SCNC: 27 MMOL/L — SIGNIFICANT CHANGE UP (ref 22–31)
COLOR SPEC: YELLOW — SIGNIFICANT CHANGE UP
CREAT SERPL-MCNC: 0.88 MG/DL — SIGNIFICANT CHANGE UP (ref 0.5–1.3)
DIFF PNL FLD: NEGATIVE — SIGNIFICANT CHANGE UP
EGFR: 69 ML/MIN/1.73M2 — SIGNIFICANT CHANGE UP
EOSINOPHIL # BLD AUTO: 0.19 K/UL — SIGNIFICANT CHANGE UP (ref 0–0.5)
EOSINOPHIL NFR BLD AUTO: 2.3 % — SIGNIFICANT CHANGE UP (ref 0–6)
GLUCOSE SERPL-MCNC: 111 MG/DL — HIGH (ref 70–99)
GLUCOSE UR QL: NEGATIVE MG/DL — SIGNIFICANT CHANGE UP
HCT VFR BLD CALC: 40.1 % — SIGNIFICANT CHANGE UP (ref 34.5–45)
HGB BLD-MCNC: 13.3 G/DL — SIGNIFICANT CHANGE UP (ref 11.5–15.5)
IMM GRANULOCYTES NFR BLD AUTO: 0.4 % — SIGNIFICANT CHANGE UP (ref 0–0.9)
INR BLD: 0.98 — SIGNIFICANT CHANGE UP (ref 0.85–1.18)
KETONES UR-MCNC: NEGATIVE MG/DL — SIGNIFICANT CHANGE UP
LEUKOCYTE ESTERASE UR-ACNC: NEGATIVE — SIGNIFICANT CHANGE UP
LIDOCAIN IGE QN: 28 U/L — SIGNIFICANT CHANGE UP (ref 7–60)
LYMPHOCYTES # BLD AUTO: 1.9 K/UL — SIGNIFICANT CHANGE UP (ref 1–3.3)
LYMPHOCYTES # BLD AUTO: 22.5 % — SIGNIFICANT CHANGE UP (ref 13–44)
MCHC RBC-ENTMCNC: 28.8 PG — SIGNIFICANT CHANGE UP (ref 27–34)
MCHC RBC-ENTMCNC: 33.2 GM/DL — SIGNIFICANT CHANGE UP (ref 32–36)
MCV RBC AUTO: 86.8 FL — SIGNIFICANT CHANGE UP (ref 80–100)
MONOCYTES # BLD AUTO: 0.71 K/UL — SIGNIFICANT CHANGE UP (ref 0–0.9)
MONOCYTES NFR BLD AUTO: 8.4 % — SIGNIFICANT CHANGE UP (ref 2–14)
NEUTROPHILS # BLD AUTO: 5.55 K/UL — SIGNIFICANT CHANGE UP (ref 1.8–7.4)
NEUTROPHILS NFR BLD AUTO: 65.8 % — SIGNIFICANT CHANGE UP (ref 43–77)
NITRITE UR-MCNC: NEGATIVE — SIGNIFICANT CHANGE UP
NRBC # BLD: 0 /100 WBCS — SIGNIFICANT CHANGE UP (ref 0–0)
PH UR: 5.5 — SIGNIFICANT CHANGE UP (ref 5–8)
PLATELET # BLD AUTO: 357 K/UL — SIGNIFICANT CHANGE UP (ref 150–400)
POTASSIUM SERPL-MCNC: 4.1 MMOL/L — SIGNIFICANT CHANGE UP (ref 3.5–5.3)
POTASSIUM SERPL-SCNC: 4.1 MMOL/L — SIGNIFICANT CHANGE UP (ref 3.5–5.3)
PROT SERPL-MCNC: 7.6 G/DL — SIGNIFICANT CHANGE UP (ref 6–8.3)
PROT UR-MCNC: NEGATIVE MG/DL — SIGNIFICANT CHANGE UP
PROTHROM AB SERPL-ACNC: 11.2 SEC — SIGNIFICANT CHANGE UP (ref 9.5–13)
RBC # BLD: 4.62 M/UL — SIGNIFICANT CHANGE UP (ref 3.8–5.2)
RBC # FLD: 13.3 % — SIGNIFICANT CHANGE UP (ref 10.3–14.5)
RH IG SCN BLD-IMP: POSITIVE — SIGNIFICANT CHANGE UP
SODIUM SERPL-SCNC: 137 MMOL/L — SIGNIFICANT CHANGE UP (ref 135–145)
SP GR SPEC: 1.01 — SIGNIFICANT CHANGE UP (ref 1–1.03)
UROBILINOGEN FLD QL: 0.2 MG/DL — SIGNIFICANT CHANGE UP (ref 0.2–1)
WBC # BLD: 8.43 K/UL — SIGNIFICANT CHANGE UP (ref 3.8–10.5)
WBC # FLD AUTO: 8.43 K/UL — SIGNIFICANT CHANGE UP (ref 3.8–10.5)

## 2024-05-05 PROCEDURE — 74177 CT ABD & PELVIS W/CONTRAST: CPT | Mod: 26,MC

## 2024-05-05 PROCEDURE — 71045 X-RAY EXAM CHEST 1 VIEW: CPT | Mod: 26

## 2024-05-05 PROCEDURE — 93010 ELECTROCARDIOGRAM REPORT: CPT

## 2024-05-05 PROCEDURE — 99285 EMERGENCY DEPT VISIT HI MDM: CPT | Mod: FS

## 2024-05-05 PROCEDURE — 88304 TISSUE EXAM BY PATHOLOGIST: CPT | Mod: 26

## 2024-05-05 DEVICE — LIGATING CLIPS WECK HEMOLOK POLYMER MEDIUM-LARGE (GREEN) 6: Type: IMPLANTABLE DEVICE | Status: FUNCTIONAL

## 2024-05-05 DEVICE — LIGATING CLIPS WECK HEMOLOK POLYMER LARGE (PURPLE) 6: Type: IMPLANTABLE DEVICE | Status: FUNCTIONAL

## 2024-05-05 RX ORDER — SODIUM CHLORIDE 9 MG/ML
1000 INJECTION, SOLUTION INTRAVENOUS
Refills: 0 | Status: DISCONTINUED | OUTPATIENT
Start: 2024-05-05 | End: 2024-05-05

## 2024-05-05 RX ORDER — ACETAMINOPHEN 500 MG
1000 TABLET ORAL ONCE
Refills: 0 | Status: COMPLETED | OUTPATIENT
Start: 2024-05-05 | End: 2024-05-05

## 2024-05-05 RX ORDER — METRONIDAZOLE 500 MG
500 TABLET ORAL EVERY 12 HOURS
Refills: 0 | Status: DISCONTINUED | OUTPATIENT
Start: 2024-05-05 | End: 2024-05-05

## 2024-05-05 RX ORDER — OXYCODONE HYDROCHLORIDE 5 MG/1
1 TABLET ORAL
Qty: 5 | Refills: 0
Start: 2024-05-05

## 2024-05-05 RX ORDER — ACETAMINOPHEN 500 MG
650 TABLET ORAL EVERY 6 HOURS
Refills: 0 | Status: DISCONTINUED | OUTPATIENT
Start: 2024-05-05 | End: 2024-05-05

## 2024-05-05 RX ORDER — HEPARIN SODIUM 5000 [USP'U]/ML
5000 INJECTION INTRAVENOUS; SUBCUTANEOUS EVERY 8 HOURS
Refills: 0 | Status: DISCONTINUED | OUTPATIENT
Start: 2024-05-05 | End: 2024-05-05

## 2024-05-05 RX ORDER — HYDROMORPHONE HYDROCHLORIDE 2 MG/ML
0.5 INJECTION INTRAMUSCULAR; INTRAVENOUS; SUBCUTANEOUS ONCE
Refills: 0 | Status: DISCONTINUED | OUTPATIENT
Start: 2024-05-05 | End: 2024-05-05

## 2024-05-05 RX ORDER — IOHEXOL 300 MG/ML
30 INJECTION, SOLUTION INTRAVENOUS ONCE
Refills: 0 | Status: COMPLETED | OUTPATIENT
Start: 2024-05-05 | End: 2024-05-05

## 2024-05-05 RX ORDER — KETOROLAC TROMETHAMINE 30 MG/ML
30 SYRINGE (ML) INJECTION EVERY 8 HOURS
Refills: 0 | Status: DISCONTINUED | OUTPATIENT
Start: 2024-05-05 | End: 2024-05-05

## 2024-05-05 RX ORDER — CEFTRIAXONE 500 MG/1
1000 INJECTION, POWDER, FOR SOLUTION INTRAMUSCULAR; INTRAVENOUS EVERY 24 HOURS
Refills: 0 | Status: DISCONTINUED | OUTPATIENT
Start: 2024-05-05 | End: 2024-05-05

## 2024-05-05 RX ORDER — DOCUSATE SODIUM 100 MG
1 CAPSULE ORAL
Qty: 15 | Refills: 0
Start: 2024-05-05 | End: 2024-05-19

## 2024-05-05 RX ORDER — ONDANSETRON 8 MG/1
4 TABLET, FILM COATED ORAL EVERY 6 HOURS
Refills: 0 | Status: DISCONTINUED | OUTPATIENT
Start: 2024-05-05 | End: 2024-05-05

## 2024-05-05 RX ORDER — OXYCODONE HYDROCHLORIDE 5 MG/1
10 TABLET ORAL EVERY 8 HOURS
Refills: 0 | Status: DISCONTINUED | OUTPATIENT
Start: 2024-05-05 | End: 2024-05-05

## 2024-05-05 RX ORDER — OXYCODONE HYDROCHLORIDE 5 MG/1
5 TABLET ORAL EVERY 8 HOURS
Refills: 0 | Status: DISCONTINUED | OUTPATIENT
Start: 2024-05-05 | End: 2024-05-05

## 2024-05-05 RX ADMIN — IOHEXOL 30 MILLILITER(S): 300 INJECTION, SOLUTION INTRAVENOUS at 12:03

## 2024-05-05 RX ADMIN — Medication 1000 MILLIGRAM(S): at 12:51

## 2024-05-05 RX ADMIN — Medication 400 MILLIGRAM(S): at 11:33

## 2024-05-05 NOTE — H&P ADULT - NSHPPHYSICALEXAM_GEN_ALL_CORE
Vital Signs Last 24 Hrs  T(C): 36.6 (05 May 2024 15:46), Max: 36.6 (05 May 2024 15:46)  T(F): 97.9 (05 May 2024 15:46), Max: 97.9 (05 May 2024 15:46)  HR: 84 (05 May 2024 15:46) (69 - 84)  BP: 142/78 (05 May 2024 15:46) (141/82 - 142/78)  BP(mean): --  RR: 18 (05 May 2024 15:46) (18 - 20)  SpO2: 96% (05 May 2024 15:46) (96% - 99%)    Parameters below as of 05 May 2024 15:46  Patient On (Oxygen Delivery Method): room air    Physical Exam  General: AAOx3, NAD, laying comfortably in bed  Cardio: S1,S2, No MRG  Pulm: Nonlabored breathing  Abdomen: soft, nondistended, severe TTP in RUQ with volunatry guarding and +Macdonald  Extremities: WWP, peripheral pulses appreciated

## 2024-05-05 NOTE — ED ADULT NURSE NOTE - OBJECTIVE STATEMENT
73 y/o female c/o RUQ abdominal pain and nausea, scheduled for cholecystectomy on 5/9/24, Pt with hx of cholelithiasis with sludge.

## 2024-05-05 NOTE — ED ADULT NURSE NOTE - TEMPLATE
Component Value Date    LDLCALC 132 (H) 04/04/2019     Previously----> Patient has a history of statin intolerance as well as Zetia intolerance. She discussed the issue of PCSK-9 antagonists with the cardiologist.    She states she tries to eat healthy. She is not very interested in the [place]. CKD-- Stage 3. She sees Dr. Gloria Altamirano. She has an appointment in October. She denies urination difficulties. She denies foamy or bubbly urine, hematuria, dysuria. She denies increased edema. Past Medical History:   Diagnosis Date    Acquired spondylolisthesis of lumbosacral region     Bilateral primary osteoarthritis of hip     CAD (coronary artery disease)     one stent in heart    Chronic bilateral low back pain without sciatica 2/8/2018    CKD (chronic kidney disease), stage III (HCC)     DDD (degenerative disc disease), lumbar     Dr. Vianca Bond GERD (gastroesophageal reflux disease)     Headache(784.0)     Heel spur 08/2016    History of lumbar spinal fusion     Hyperlipidemia     Hypertension     IBS (irritable bowel syndrome)     Lyme disease 2009    suspected    MVP (mitral valve prolapse)     Neuroma nos     Osteoarthritis     Osteopenia     Post-laminectomy syndrome     Rotator cuff tear     Dr. Rogers Encarnacion    Squamous cell cancer of skin of right hand 05/2018    squamous cell carcinoma    Stress incontinence, female     Urinary incontinence          Review of Systems-- As per HPI     Objective     /88   Pulse 71   Resp 14   Ht 5' 4\" (1.626 m)   Wt 186 lb 9.6 oz (84.6 kg)   SpO2 95%   BMI 32.03 kg/m²   Wt Readings from Last 3 Encounters:   07/19/19 186 lb 9.6 oz (84.6 kg)   04/09/19 185 lb (83.9 kg)   04/04/19 185 lb (83.9 kg)     GEN: AND. A&O  HEENT: NC/AT, SWAPNA, EOMI. Anicteric. Oral cavity clear. Tongue midline  NECK: Supple, No thyromegaly or nodules. No JVD. Trachea midline.   LYMPH:  No C/SC nodes  CV: Regular Abdominal Pain, N/V/D

## 2024-05-05 NOTE — H&P ADULT - NSHPADDITIONALINFOADULT_GEN_ALL_CORE
***Senior Surgical Resident****    73yo F, HTN, Anxiety, recent outpatient HIDA noted to be w/nl, RUQ u/s a cholelithiasis + biliary sludge, who presented to ER this AM w acute onset RUQ pain. Was scheduled for RA Lap cholecystectomy this week w Dr. Page. HD w/nl, abd soft, TTP in RUQ, positive for Macdonald’s. WBC 8.4 w/o left shift, AST 89. CT AP w cholelithiasis w/o evidence of cholecystitis. Admitted w biliary colic, NPO/IVF, OR.

## 2024-05-05 NOTE — PROGRESS NOTE ADULT - SUBJECTIVE AND OBJECTIVE BOX
POST-OPERATIVE NOTE    Procedure: robot-assisted laparoscopic cholecystectomy     Diagnosis/Indication: biliary colic     Surgeon: Dr. Page     S: Pt seen and examined postoperatively. Patient has no acute complaints and requesting to go home. Denies CP, SOB, N/V. Tolerating CLD at bedside. Pain controlled with medication. Voided 250cc since surgery.     O:  T(C): 36.7 (05-05-24 @ 19:50), Max: 36.7 (05-05-24 @ 19:50)  T(F): 98 (05-05-24 @ 19:50), Max: 98 (05-05-24 @ 19:50)  HR: 62 (05-05-24 @ 19:50) (62 - 78)  BP: 132/71 (05-05-24 @ 19:50) (132/71 - 167/70)  RR: 18 (05-05-24 @ 19:50) (16 - 25)  SpO2: 94% (05-05-24 @ 19:50) (94% - 99%)  Wt(kg): --                        13.3   8.43  )-----------( 357      ( 05 May 2024 11:34 )             40.1     05-05    137  |  99  |  25<H>  ----------------------------<  111<H>  4.1   |  27  |  0.88    Ca    10.2      05 May 2024 11:34    TPro  7.6  /  Alb  4.2  /  TBili  0.6  /  DBili  x   /  AST  89<H>  /  ALT  35  /  AlkPhos  101  05-05      Gen: NAD, resting comfortably in bed   Pulm: Nonlabored breathing, no respiratory distress  Abd: soft, NT/ND. (-) rebound, (-) guarding. incisions c/d/i   Extrem: WWP. (-) edema       A/P: 73yo Female pt of Dr. Will's with PMH of HTN, cholelithiasis, and anxiety and no significant PSx presents to the ED on 5/5 with a two hour history of several RUQ pain associated with nausea. Pt afebrile, VSS. Abdomen soft, nondistended with significant RUQ TTP and +Macdonald. Labs significant for WBC 8.43, Hb 13.3 TBili 0.6, AST 89, ALT 35, Lipase 28. CTAP with PO/IV contrast demonstrates multiple irregular hypodense lesions throughout the right hepatic lobe with suggestion of nodular peripheral enhancement which likely represent hemangiomas. No other acute intraabdominal pathology noted. Given duration of pain and known history of cholelithiasis seen on outpatient U/S differential includes symptomatic cholelithiasis vs early acute cholecystitis. Now s/p RA lap cholecystectomy.     CLD (lowfat)   pain/nausea control   SQH/OOBA/SCDs/IS  Plan: dc home once meets PACU criteria 
How Severe Is Your Dry Skin?: mild
Additional History: No improvement with moisturizers and hydrocortisone.

## 2024-05-05 NOTE — DISCHARGE NOTE NURSING/CASE MANAGEMENT/SOCIAL WORK - PATIENT PORTAL LINK FT
You can access the FollowMyHealth Patient Portal offered by [facility] by registering at the following website: [email or web address]. By joining [employer]’s FollowMyHealth portal, you will also be able to view your health information using other applications (apps) compatible with our system.

## 2024-05-05 NOTE — H&P ADULT - NSHPLABSRESULTS_GEN_ALL_CORE
LABS:                        13.3   8.43  )-----------( 357      ( 05 May 2024 11:34 )             40.1     05-05    137  |  99  |  25<H>  ----------------------------<  111<H>  4.1   |  27  |  0.88    Ca    10.2      05 May 2024 11:34    TPro  7.6  /  Alb  4.2  /  TBili  0.6  /  DBili  x   /  AST  89<H>  /  ALT  35  /  AlkPhos  101  05-05    PT/INR - ( 05 May 2024 11:34 )   PT: 11.2 sec;   INR: 0.98          PTT - ( 05 May 2024 11:34 )  PTT:32.5 sec    ACC: [id number] EXAM: CT ABDOMEN AND PELVIS OC IC ORDERED BY: UGO PENNY    PROCEDURE DATE: 05/05/2024        INTERPRETATION: CLINICAL INFORMATION: Right upper quadrant pain.    COMPARISON: None.    CONTRAST/COMPLICATIONS:  IV Contrast: Isovue 370 90 cc administered 10 cc discarded  Oral Contrast: Omnipaque 300  Complications: None reported at time of study completion    PROCEDURE:  CT of the Abdomen and Pelvis was performed.  Sagittal and coronal reformats were performed.    FINDINGS:  LOWER CHEST: Within normal limits.    LIVER: Multiple irregular hypodense lesions throughout the right hepatic lobe with suggestion of nodular peripheral enhancement.  BILE DUCTS: Normal caliber.  GALLBLADDER: Cholelithiasis.  SPLEEN: Within normal limits.  PANCREAS: Within normal limits.  ADRENALS: Within normal limits.  KIDNEYS/URETERS: No renal stones or hydronephrosis. Bilateral subcentimeter hypodensities, too small to characterize.    BLADDER: Minimally distended.  REPRODUCTIVE ORGANS: Leiomyomatous uterus.    BOWEL: No bowel obstruction. Appendix is normal.  PERITONEUM: No ascites.  VESSELS: Within normal limits.  RETROPERITONEUM/LYMPH NODES: No lymphadenopathy.  ABDOMINAL WALL: Within normal limits.  BONES: Within normal limits.    IMPRESSION:  No acute intra-abdominal or pelvic findings.    Multiple irregular hypodense lesions throughout the right hepatic lobe with suggestion of nodular peripheral enhancement. These likely represent hemangiomas, however further evaluation can be obtained with a nonemergent MRI abdomen with and without contrast.

## 2024-05-05 NOTE — DISCHARGE NOTE PROVIDER - NSDCFUADDAPPT_GEN_ALL_CORE_FT
Please follow up with Dr. Page in one week; you may call the office to make an appointment at your earliest convenience [phone].

## 2024-05-05 NOTE — ED PROVIDER NOTE - ATTENDING APP SHARED VISIT CONTRIBUTION OF CARE
73 yo F h/o HTN, anxiety , gallstones c/o severe RUQ abd pain radiating to her back that woke her from sleep overnight.  Pt w outpatient u/s showing gallstones and sludge w plan for cholecystectomy this week w Dr Page.  No fever, chills, n/v, dysuria, hematuria, diarrhea, CP, SOB, all other ROS negative.  Well appearing, nad, nc/at, lung cta, heart reg, abd soft, ttp RUQ, ext no gross deformity, no gross neuro deficits Pt c/o ruq pain w known stones, suspect cholecystitis.  Plan labs, surg consult, imaging per surg; poss admit.  Reassess.

## 2024-05-05 NOTE — PATIENT PROFILE ADULT - NSPROPTRIGHTCAREGIVER_GEN_A_NUR
no Patient had lightheadedness before syncopal episode. She was able to take braces off her lip, no need for intervention.

## 2024-05-05 NOTE — DISCHARGE NOTE PROVIDER - HOSPITAL COURSE
73yo Female pt of Dr. Will's with PMH of HTN, cholelithiasis, and anxiety and no significant PSx presents to the ED on 5/5 with a two hour history of several RUQ pain associated with nausea. Pt afebrile, VSS. Abdomen soft, nondistended with significant RUQ TTP and +Macdonald. Labs significant for WBC 8.43, Hb 13.3 TBili 0.6, AST 89, ALT 35, Lipase 28. CTAP with PO/IV contrast demonstrates multiple irregular hypodense lesions throughout the right hepatic lobe with suggestion of nodular peripheral enhancement which likely represent hemangiomas. No other acute intraabdominal pathology noted. Given duration of pain and known history of cholelithiasis seen on outpatient U/S differential includes symptomatic cholelithiasis vs early acute cholecystitis. Pt is now s/p robot-assisted laparoscopic cholecystectomy. Her postoperative course was unremarkable with advancement of diet and pain control. On day of discharge patient was stable to be d/c'd home.

## 2024-05-05 NOTE — ED PROVIDER NOTE - OBJECTIVE STATEMENT
75 y/o f hx HTN, anxiety presents c/o RUQ abd pain radiating to her back.  Pt stating she has been having ongoing mild sx of upper abd pain, has seen Dr. Page surgery outpatient and u/s showing gallstones and sludge, plan is for cholecystectomy this week.  Pt stating she woke up early morning out of her sleep today with worse RUQ pain which radiates to her back.  Pt denies fever, chills, n/v, dysuria, CP, SOB, all other ROS negative.

## 2024-05-05 NOTE — H&P ADULT - HISTORY OF PRESENT ILLNESS
73yo Female pt of Dr. Will's with PMH of HTN, cholelithiasis, and anxiety and no significant PSx presents to the ED on 5/5 with a two hour history of several RUQ pain associated with nausea. Pt states that this morning around 10AM she was woken up with severe RUQ pain which radiated to her back associated with nausea, but no emesis. Pt states she has been worked up for reflux issues in the past and was found to have gallstones and sludge on outpatient U/S with plan for elective cholecystectomy this Thursday with Dr. Will. Given the severity of her pain she decided to come to the ED for further evaluation. On presentation, pt reports continued severe pain in the RUQ. Denies current nausea or vomiting. Reports she is passing flatus and having normal bowel function. Denies any other constitutional symptoms include fevers or chills.     Last colonoscopy/EGD - 9 months ago, c-scope with 1 benign polyp removed, EGD wnl  Denies family hx of IBS, Crohn's, UC, or colon cancer.    In the ED, pt afebrile, nontachycardic, normotensive, and satting well on RA. Abdomen soft, nondistended with singificant RUQ TTP with guarding and + strong sign. Labs significant for WBC 8.43, Hb 13.3, TBili 0.6, AST 89, ALT 35, Lipase 28. CTAP with PO/IV contrast demonstrates multiple irregular hypodense lesions throughout the right hepatic lobe with suggestion of nodular peripheral enhancement which likely represent hemangiomas. No other acute intraabdominal pathology noted    PMH: HTN, acid reflux, anxiety, cholelithiasis  PS[facility]  Social Hx: Nonsmoker, social Etoh, no illicits  Family Hx: Unremarkable    Meds: Paxil 40mg qd, Losartan 50mg qd, Wellbutrin 150mg qd, omeprazole 40mg BID

## 2024-05-05 NOTE — PATIENT PROFILE ADULT - FUNCTIONAL ASSESSMENT - BASIC MOBILITY 5.
Reason for Disposition  • Pain or burning with passing urine    Protocols used: URINE - BLOOD IN-A-AH    
  Symptoms: blood in urine, some discomfort with urination. If she drinks a lot of water seems to be better. Light to regular red.    Symptoms started: 3 days    Have you been treated in the past: np    Does patient have a fever of >100.4 AND/OR experiencing respiratory symptoms? No    IF YES, then ask..    Have you traveled to [place], [place], [place], [place], [place] in [place], [place], [place] and [place] within 14 days of the onset of your symptom? No    Have you had contact with a confirmed corona virus individual within 14 days of the onset of your symptoms? No    Ask yourself is this an immediate:  If both symptoms and travel history are positive, [facility] RN team  If sign and symptom finish encounter send to RN team    Okay to leave a detailed message? Yes    Comments: Patient speaks Romanian. Daughter Kimmy calling on her behalf.    Took message per nurses.    
Chart reviewed. Call to patient's daughter, Kimmy, as patient speaks only Venezuelan. Daughter reports that patient has been passing blood when she urinated for the past 3 days. Amount of blood varies and seems to depend on patient's water intake. Daughter unable to tell writer if patient is passing clots, but says that urine color is pink to bright red. Patient rates pain 3-5/10. Denies low back and flank pain. Denies fever. Daughter encouraged to have Mom force fluids. Also encouraged to take her to Urgent Care or ER for evaluation of symptoms. Daughter given addresses of 2 closest WIC's and ER's as patient was triaged for unrelated symptoms on 11/13 and family failed to take patient for care. Patient verbalized understanding of directions and importance of care and agreed to same. No further questions at this time.   
3 = A little assistance

## 2024-05-05 NOTE — ED ADULT TRIAGE NOTE - CHIEF COMPLAINT QUOTE
pt c/o RUQ pain radiating to back. states, "I was supposed to have my gallbladder removed thursday." +nausea. denies vomiting, diarrhea, f/c. pt c/o RUQ pain radiating to back. states, "I'm supposed to have my gallbladder removed thursday." +nausea. denies vomiting, diarrhea, f/c.

## 2024-05-05 NOTE — H&P ADULT - ASSESSMENT
73yo Female pt of Dr. Will's with PMH of HTN, cholelithiasis, and anxiety and no significant PSx presents to the ED on 5/5 with a two hour history of several RUQ pain associated with nausea. Pt afebrile, VSS. Abdomen soft, nondistended with significant RUQ TTP and +Macdonald. Labs significant for WBC 8.43, Hb 13.3 TBili 0.6, AST 89, ALT 35, Lipase 28. CTAP with PO/IV contrast demonstrates multiple irregular hypodense lesions throughout the right hepatic lobe with suggestion of nodular peripheral enhancement which likely represent hemangiomas. No other acute intraabdominal pathology noted. Given duration of pain and known history of cholelithiasis seen on outpatient U/S differential includes symptomatic cholelithiasis vs early acute cholecystitis.     Plan:  Regional  NPO/IVF  Cef/Flagyl  Home meds as appropriate  HSQ/SCDs/IS/OOB  OR for robot-assisted cholecystectomy

## 2024-05-05 NOTE — DISCHARGE NOTE PROVIDER - CARE PROVIDER_API CALL
Briana Page   Surgery  [address], [address]  [place], [place] [address]  Phone: [phone]  Fax: [phone]  Follow Up Time: 1 week

## 2024-05-05 NOTE — DISCHARGE NOTE PROVIDER - NSDCFUADDINST_GEN_ALL_CORE_FT
Please follow up with your PCP regarding restarting your Losartan postoperatively.    Please follow up with Dr. Pgae next week. Call his office to schedule an appointment: [phone]. Bed rest for 4 days. Use ice packs to the affected area at all times. Drink at least 2L of water in the first 24 hours. Remain on liquid diet until bowel movement, then you may have regular diet. Take 2 extra strength Tylenol + 1 Advil = 3 tablets at the same time every 6 hours. Call the office if you experience increasing pain, nausea, vomiting, swelling, redness, or drainage from incision site, temperature >101.4F      General Discharge Instructions:  Please resume all regular home medications unless specifically advised not to take a particular medication. Also, please take any new medications as prescribed.  Please get plenty of rest, continue to ambulate several times per day, and drink adequate amounts of fluids. Avoid lifting weights greater than 5-10 lbs until you follow-up with your surgeon, who will instruct you further regarding activity restrictions.  Avoid driving or operating heavy machinery while taking pain medications.  Please follow-up with your surgeon and Primary Care Provider (PCP) as advised.  Incision Care:  *Please call your doctor or nurse practitioner if you have increased pain, swelling, redness, or drainage from the incision site.  *Avoid swimming and baths until your follow-up appointment.  *You may shower, and wash surgical incisions with a mild soap and warm water. Gently pat the area dry.  *If you have staples, they will be removed at your follow-up appointment.  *If you have steri-strips, they will fall off on their own. Please remove any remaining strips 7-10 days after surgery.     Warning Signs:  Please call your doctor or nurse practitioner if you experience the following:  *You experience new chest pain, pressure, squeezing or tightness.  *New or worsening cough, shortness of breath, or wheeze.  *If you are vomiting and cannot keep down fluids or your medications.  *You are getting dehydrated due to continued vomiting, diarrhea, or other reasons. Signs of dehydration include dry mouth, rapid heartbeat, or feeling dizzy or faint when standing.  *You see blood or dark/black material when you vomit or have a bowel movement.  *You experience burning when you urinate, have blood in your urine, or experience a discharge.  *Your pain is not improving within 8-12 hours or is not gone within 24 hours. Call or return immediately if your pain is getting worse, changes location, or moves to your chest or back.  *You have shaking chills, or fever greater than 101.5 degrees Fahrenheit or 38 degrees Celsius.  *Any change in your symptoms, or any new symptoms that concern you.     Please take Tylenol 1000mg every 6 hours for pain. You may alternate every 3 hours with ibuprofen as needed.   Please take oxycodone 5mg every 6 hours as needed for pain. Take colace 100mg daily while taking oxycodone to prevent constipation.

## 2024-05-05 NOTE — PATIENT PROFILE ADULT - FALL HARM RISK - HARM RISK INTERVENTIONS

## 2024-05-05 NOTE — BRIEF OPERATIVE NOTE - OPERATION/FINDINGS
Supraumbilical ike cutdown, 8mm trochar x3 placed under direct visualization. Pt placed in reverse Trendelenburg w/ right side up. Cystic duct and artery dissected free. Critical view of safety obtained. Cystic duct and artery clipped and divided. Peritoneal attachments btw GB & liver bed  w/ electrocautery. Hemostasis achieved. No leakage of bile from cystic duct stump. Fascia closed 0-Maxon, skin closed w monocryl + Dermabond.

## 2024-05-05 NOTE — ED PROVIDER NOTE - CLINICAL SUMMARY MEDICAL DECISION MAKING FREE TEXT BOX
73 y/o f hx HTN, anxiety presents c/o worsening RUQ abd pain today.  Pt afebrile in ED, vitals unremarkable, +RUQ tenderness on exam.  Labs sent, discussed with surgery who request CT a/p.  Pt given IV tylenol for pain and feeling improved.  Will f/u CT and surgery recs.

## 2024-05-05 NOTE — DISCHARGE NOTE NURSING/CASE MANAGEMENT/SOCIAL WORK - NSDCFUADDAPPT_GEN_ALL_CORE_FT
Please follow up with Dr. Page in one week; you may call the office to make an appointment at your earliest convenience [phone].

## 2024-05-05 NOTE — ED ADULT NURSE NOTE - CHIEF COMPLAINT QUOTE
pt c/o RUQ pain radiating to back. states, "I'm supposed to have my gallbladder removed thursday." +nausea. denies vomiting, diarrhea, f/c.

## 2024-05-08 LAB — SURGICAL PATHOLOGY STUDY: SIGNIFICANT CHANGE UP

## 2024-05-15 DIAGNOSIS — K80.00 CALCULUS OF GALLBLADDER WITH ACUTE CHOLECYSTITIS WITHOUT OBSTRUCTION: ICD-10-CM

## 2024-05-15 DIAGNOSIS — D18.03 HEMANGIOMA OF INTRA-ABDOMINAL STRUCTURES: ICD-10-CM

## 2024-05-15 DIAGNOSIS — I10 ESSENTIAL (PRIMARY) HYPERTENSION: ICD-10-CM

## 2024-05-15 DIAGNOSIS — K42.9 UMBILICAL HERNIA WITHOUT OBSTRUCTION OR GANGRENE: ICD-10-CM

## 2024-05-15 DIAGNOSIS — F41.9 ANXIETY DISORDER, UNSPECIFIED: ICD-10-CM

## 2024-05-17 NOTE — PATIENT PROFILE ADULT. - DOES PATIENT HAVE ADVANCE DIRECTIVE
Chief Complaint:   Fariba Sosa, a 50 year old female is here today for   Chief Complaint   Patient presents with    Rash     Subjective:   50 year old female is here today with cold symptoms and rash. She was seen 2 days ago. Reports existing rash spots have improved, but now she has developed new spots.  She states that they are itchy.  She has a couple more within her scalp.  The spots on her forehead are improving.  The spots on her arms are also somewhat improving.  Cold symptoms are getting better.    Current Outpatient Medications   Medication Sig Dispense Refill    Lactobacillus (FLORAJEN WOMEN PO)       predniSONE (DELTASONE) 20 MG tablet Take 2 tablets by mouth daily for 3 days. 6 tablet 0    cephalexin (KEFLEX) 500 MG capsule Take 1 capsule by mouth 4 times daily for 7 days. 28 capsule 0    meloxicam (MOBIC) 15 MG tablet Take 1 tablet by mouth daily. (Patient not taking: Reported on 5/15/2024) 20 tablet 2    cholecalciferol (VITAMIN D) 25 mcg (1,000 units) tablet Take 1,000 Units by mouth. (Patient not taking: Reported on 2024)      Multiple Vitamins-Minerals (MULTIVITAMIN ADULT PO)  (Patient not taking: Reported on 2024)       No current facility-administered medications for this visit.     ALLERGIES:   Allergen Reactions    Amoxicillin DIARRHEA     DIARRHEA     Social History     Tobacco Use    Smoking status: Former     Current packs/day: 0.00     Types: Cigarettes     Quit date: 1998     Years since quittin.8    Smokeless tobacco: Never   Vaping Use    Vaping status: never used   Substance Use Topics    Alcohol use: Not Currently     Comment: 2x week    Drug use: No       Physical Exam:  Vitals:    24 1215   BP: 104/70   Pulse: 88   Resp: 16   Temp: 97.8 °F (36.6 °C)     General Appearance:  appears healthy, alert, polite and cooperative, in no distress.  Skin:   When comparing to the lesions noted from a couple days ago, they are flatter without any type of central  punctum.  They are less inflamed.  The couple additional/new lesions to the scalp are quite small approximately 1-2 mm in diameter.  No central punctum which could be drained or swabbed.  They are bothersome/itchy..         Assessment:  1. Rash    50-year-old female presents for re-evaluation of idiopathic rash.  It looks like overall it is getting better on antibiotics.  Discussed differentials with patient.  It sounds like she is doing quite a bit of spring cleaning prior to onset.  Certainly this could be related to some type of contact irritants such as bug bites.  Discussed that since the antibiotics do seem to be helping I think it is reasonable to continue them.  Given that they are itchy and she is developing new spots, I do think it would also be beneficial to put her on some steroids to calm down her immune system and prevent new rash spots.    Plan:  Start prednisone 40 mg daily for 3 days.    Finish antibiotic prescription.    For any worsening of symptoms or failure to improve, recommend re-evaluation with their PCP the emergency room or in urgent care.  Patient understands and agrees with this plan.     A total of 30 minutes was spent on today's visit for history taking, patient examination, discussion of treatment options and differentials with patient, and treatment planning.               No

## 2024-06-05 RX ORDER — LOSARTAN POTASSIUM 100 MG/1
1 TABLET, FILM COATED ORAL
Refills: 0 | DISCHARGE

## 2024-06-05 RX ORDER — BUPROPION HYDROCHLORIDE 150 MG/1
1 TABLET, EXTENDED RELEASE ORAL
Refills: 0 | DISCHARGE

## 2024-06-05 RX ORDER — OMEPRAZOLE 10 MG/1
1 CAPSULE, DELAYED RELEASE ORAL
Refills: 0 | DISCHARGE

## 2024-11-19 NOTE — PRE-ANESTHESIA EVALUATION ADULT - WEIGHT IN LBS
Clear
190

## (undated) DEVICE — XI ENDOWRIST SUCTION IRRIGATOR 8MM

## (undated) DEVICE — FOLEY TRAY 16FR 5CC LF UMETER CLOSED

## (undated) DEVICE — DRAPE 1/2 SHEET 40X57"

## (undated) DEVICE — XI DRAPE ARM

## (undated) DEVICE — SUT MAXON 0 30" HGU-46

## (undated) DEVICE — PACK GENERAL LAPAROSCOPY

## (undated) DEVICE — ENDOCATCH 10MM SPECIMEN POUCH

## (undated) DEVICE — SYR SLIP 10CC

## (undated) DEVICE — XI DRAPE COLUMN

## (undated) DEVICE — XI SEAL UNIV 5- 8 MM

## (undated) DEVICE — PREP SCRUB BRUSH W CHG 4%

## (undated) DEVICE — ELCTR GROUNDING PAD ADULT COVIDIEN

## (undated) DEVICE — DRSG DERMABOND 0.7ML

## (undated) DEVICE — GLV 7 PROTEXIS (WHITE)

## (undated) DEVICE — XI TIP COVER

## (undated) DEVICE — BLADE SCALPEL SAFETY #11 WITH PLASTIC GREEN HANDLE

## (undated) DEVICE — SUT MONOCRYL 4-0 27" PS-2 UNDYED

## (undated) DEVICE — TIP METZENBAUM SCISSOR MONOPOLAR ENDOCUT (ORANGE)

## (undated) DEVICE — GLV 7.5 SENSICARE W ALOE

## (undated) DEVICE — D HELP - CLEARVIEW CLEARIFY SYSTEM

## (undated) DEVICE — PREP CHLORAPREP HI-LITE ORANGE 26ML

## (undated) DEVICE — SUT VICRYL 0 27" UR-6

## (undated) DEVICE — TUBING STRYKER PNEUMOCLEAR SMOKE EVACUATION HIGH FLOW

## (undated) DEVICE — POSITIONER PINK PAD PIGAZZI SYSTEM FULL KIT

## (undated) DEVICE — TROCAR APPLIED MEDICAL KII BALLOON BLUNT TIP 12MM X 100MM

## (undated) DEVICE — DRAPE 3/4 SHEET 52X76"

## (undated) DEVICE — TUBING STRYKER PNEUMOCLEAR HIGH FLOW

## (undated) DEVICE — DRAPE TOP SHEET 53" X 101"

## (undated) DEVICE — DRSG STERISTRIPS 0.5 X 4"

## (undated) DEVICE — GLV 7.5 PROTEXIS (WHITE)

## (undated) DEVICE — WARMING BLANKET UPPER ADULT

## (undated) DEVICE — XI OBTURATOR OPTICAL BLADELESS 8MM